# Patient Record
Sex: MALE | Race: WHITE | Employment: FULL TIME | URBAN - METROPOLITAN AREA
[De-identification: names, ages, dates, MRNs, and addresses within clinical notes are randomized per-mention and may not be internally consistent; named-entity substitution may affect disease eponyms.]

---

## 2024-12-05 RX ORDER — TRAZODONE HYDROCHLORIDE 50 MG/1
TABLET, FILM COATED ORAL
Qty: 90 TABLET | Refills: 0 | OUTPATIENT
Start: 2024-12-05

## 2025-01-06 RX ORDER — PRAVASTATIN SODIUM 20 MG
TABLET ORAL
Qty: 90 TABLET | Refills: 0 | OUTPATIENT
Start: 2025-01-06

## 2025-01-06 RX ORDER — OMEPRAZOLE 20 MG/1
20 CAPSULE, DELAYED RELEASE ORAL DAILY
Qty: 90 CAPSULE | Refills: 0 | OUTPATIENT
Start: 2025-01-06

## 2025-01-09 RX ORDER — PRAVASTATIN SODIUM 20 MG
TABLET ORAL
Qty: 90 TABLET | Refills: 0 | OUTPATIENT
Start: 2025-01-09

## 2025-02-24 ENCOUNTER — OFFICE VISIT (OUTPATIENT)
Age: 65
End: 2025-02-24
Payer: COMMERCIAL

## 2025-02-24 DIAGNOSIS — E11.65 TYPE 2 DIABETES MELLITUS WITH HYPERGLYCEMIA, WITHOUT LONG-TERM CURRENT USE OF INSULIN (HCC): Primary | ICD-10-CM

## 2025-02-24 PROCEDURE — G0108 DIAB MANAGE TRN  PER INDIV: HCPCS

## 2025-02-24 NOTE — PROGRESS NOTES
[facility]  Diabetes Self-Management Education & Support Program    Reason for Referral: T2DM  Referral Source: Sakina Mei, BORIS*  Services requested: DSMES       ASSESSMENT    From my perspective, the participant would benefit from DSMES specifically related to reducing risks, healthy eating, monitoring, taking medications, physical activity, healthy coping, and problem solving. Will adapt DSMES program to build on participant's skills score, confidence score, and preparedness score as noted in the Diabetes Skills, Confidence, and Preparedness Index.    During the program, we will focus on providing DSMES that specifically addresses participant's interest in reducing risks, healthy eating, monitoring, taking medications, physical activity, healthy coping, and problem solving, as shown by their reported readiness to change.    The participant would be best served by attending weekly individual sessions.    Diabetes Self-Management Education Follow-up Visit: 3/10/25       Clinical Presentation  Paddy Mcneil is a 64 y.o. White male referred for diabetes self-management education. Participant has Type 2 DM not on insulin for 11-20 years. Family history positive for diabetes.     Patient reports not receiving DSMES services in the past.     ost recent A1c value: 7.9 (2/3/25)    Diabetes-related medical history:    Diabetes-related medications:  Current dosing: This patient does not have an active medication from one of the medication groupers.  Metformin x2 day   Jardiance once daily     Blood Pressure Management  valsartan-hydroCHLOROthiazide - 320-25 MG      Lipid Management  pravastatin - 20 MG      Clot Prevention  This patient does not have an active medication from one of the medication groupers.    Learning Assessment  Learning objectives Educator assessment (7/3/2024)   Diabetes Disease Process  The participant can   A) describe diabetes in basic terms;   B) state the type of

## 2025-03-10 ENCOUNTER — OFFICE VISIT (OUTPATIENT)
Age: 65
End: 2025-03-10
Payer: COMMERCIAL

## 2025-03-10 DIAGNOSIS — E11.65 TYPE 2 DIABETES MELLITUS WITH HYPERGLYCEMIA, WITHOUT LONG-TERM CURRENT USE OF INSULIN (HCC): Primary | ICD-10-CM

## 2025-03-10 PROCEDURE — G0108 DIAB MANAGE TRN  PER INDIV: HCPCS

## 2025-03-10 NOTE — PROGRESS NOTES
[facility]  Diabetes Self-Management Education & Support Program  Encounter Note      SUMMARY  Diabetes self-care management training was completed related to what is diabetes and reducing risks. The participant will return on March 24 to continue DSMES related to healthy eating and monitoring. The participant did not identify SMART Goal(s) and will practice knowledge and skills related to reducing risks and healthy eating to improve diabetes self-management.        EVALUATION:  Paddy verbalized his understanding of the diabetes disease process and recommended vaccinations. He expressed that he understands the role of vaccinations and is up to date with the flu, pneumococcal, shingles, and Tdap vaccines. Paddy shared that he has peripheral neuropathy and experiences tingling in the toes of his left foot and leg. He has a podiatrist but hasn’t gone since 2021. He checks his feet daily.     He uses a glucometer to monitor BG and was given a glucose log. Paddy knows ADA blood glucose targets.    RECOMMENDATIONS:  Utilize glucose log and share with diabetes team and PCP  Continue DSMES       TOPICS DISCUSSED TODAY:  WHAT IS DIABETES? Minutes: 30  HOW DO I STAY HEALTHY? 30         DATE DSMES TOPIC EVALUATION     3/10/2025 WHAT IS DIABETES?   Role of the normal pancreas in energy balance and blood glucose control   The defect seen in diabetes   Signs & symptoms of diabetes   Diagnosis of diabetes   Types of diabetes   Blood glucose targets in non-pregnant & non-pregnant adults       The participant knows  Their type of diabetes: Yes   The basic physiologic defect: Yes  Blood glucose targets: Yes     DATE DSMES TOPIC EVALUATION     3/10/2025 HOW DO I STAY HEALTHY?   Prevention   Vaccinations   Preconception care (if applicable)  Examinations   Eye    Foot   Diabetic complications' prevention   Dental health   Heart health   Kidney Health   Nerve health   Sleep health      The participant has a

## 2025-03-24 ENCOUNTER — OFFICE VISIT (OUTPATIENT)
Age: 65
End: 2025-03-24
Payer: COMMERCIAL

## 2025-03-24 DIAGNOSIS — E11.65 TYPE 2 DIABETES MELLITUS WITH HYPERGLYCEMIA, WITHOUT LONG-TERM CURRENT USE OF INSULIN (HCC): Primary | ICD-10-CM

## 2025-03-24 PROCEDURE — G0108 DIAB MANAGE TRN  PER INDIV: HCPCS

## 2025-03-24 NOTE — PROGRESS NOTES
[facility]  Diabetes Self-Management Education & Support Program  Encounter Note      SUMMARY  Diabetes self-care management training was completed related to healthy eating. The participant will return on March 31 to continue DSMES related to healthy eating and monitoring. The participant did not identify SMART Goal(s) and will practice knowledge and skills related to healthy eating to improve diabetes self-management.      EVALUATION:  Today, we discussed healthy eating regarding the healthy plate, general nutrition guidelines, and the recommendation of 45-60 grams of carbohydrates per meal. Paddy shared that he has decreased the amount of sugar he uses to 3/4 cup in approximately 2 gallons of tea. He has also increased his water intake and is incorporating non-starchy vegetables for snacks, along with lightly salted peanuts. However, he continues to consume sugar-sweetened beverages and baked goods twice a day. Paddy is gradually making changes to his diet. We will continue healthy eating education at his next appointment.    RECOMMENDATIONS:  Utilize healthy plate   Recommend eating a meal every 4-5 hours   Low/ no carb snacks in between meals  45-60 grams CHOs per meal  Continues DSMES     TOPICS DISCUSSED TODAY:  WHAT CAN I EAT? 60         DATE DSMES TOPIC EVALUATION     3/24/2025 WHAT CAN I EAT?   General principles   Determining a healthy weight   Nutritional terms & tools   Healthy Plate method   Carbohydrate Counting   Reading food labels   Free apps   Pregnancy recommendations      The participant   Uses Healthy Plate principles in constructing meals: Yes  Reads food labels in choosing acceptable foods: No    Will continue healthy eating.      Isabelle Massey RD on 3/24/2025 at 2:15 PM    I have personally reviewed the health record, including provider notes, laboratory data and current medications before making these care and education recommendations.   Total minutes: 60

## 2025-03-31 ENCOUNTER — OFFICE VISIT (OUTPATIENT)
Age: 65
End: 2025-03-31
Payer: COMMERCIAL

## 2025-03-31 DIAGNOSIS — E11.65 TYPE 2 DIABETES MELLITUS WITH HYPERGLYCEMIA, WITHOUT LONG-TERM CURRENT USE OF INSULIN (HCC): Primary | ICD-10-CM

## 2025-03-31 PROCEDURE — G0108 DIAB MANAGE TRN  PER INDIV: HCPCS

## 2025-03-31 NOTE — PROGRESS NOTES
[facility]  Diabetes Self-Management Education & Support Program  Encounter Note      SUMMARY  Diabetes self-care management training was completed related to healthy eating. The participant will return on April 07 to continue DSMES related to reducing risks and monitoring. The participant did identify SMART Goal(s) and will practice knowledge and skills related to healthy eating to improve diabetes self-management.      EVALUATION:    Today we finished healthy eating. We discussed building meals of 45-60 grams of CHOs, all sources of carbohydrates, reading nutrition labels properly, and planning meals when eating at restaurants. Paddy demonstrated understanding by building carb controlled meals he enjoys, reading nutrition labels from sample packages, and creating a low carb healthy snack list. Paddy continues to drink orange juice, chocolate or strawberry milk in the morning (uses 1 squeeze of strawberry or chocolate syrup). He makes sweet tea with 1/2 cup of sugar and dilutes the tea. On weekends he likes to have ~1 cup of chocolate ice cream as a late night snack.     Fasting blood glucose:    3/30 164   3/31 176    Paddy is aware of glucose targets.     RECOMMENDATIONS:  Encouraged zero calorie sugar substitutes for beverages  Encouraged low carb snacks in between meals  Continue to follow diabetes nutrition guidelines   Continue DSMES     TOPICS DISCUSSED TODAY:  WHAT CAN I EAT? 60       SMART GOAL(S)   Practice building a balanced meal for 3 at least 3 times over the next week.  ACHIEVEMENT OF GOAL(S) : 0-24%       DATE DSMES TOPIC EVALUATION     3/31/2025 WHAT CAN I EAT?   General principles   Determining a healthy weight   Nutritional terms & tools   Healthy Plate method   Carbohydrate Counting   Reading food labels   Free apps   Pregnancy recommendations      The participant   Uses Healthy Plate principles in constructing meals: Yes  Reads food labels in choosing acceptable foods:

## 2025-04-07 ENCOUNTER — OFFICE VISIT (OUTPATIENT)
Age: 65
End: 2025-04-07
Payer: COMMERCIAL

## 2025-04-07 DIAGNOSIS — E11.65 TYPE 2 DIABETES MELLITUS WITH HYPERGLYCEMIA, WITHOUT LONG-TERM CURRENT USE OF INSULIN (HCC): Primary | ICD-10-CM

## 2025-04-07 PROCEDURE — G0108 DIAB MANAGE TRN  PER INDIV: HCPCS

## 2025-04-07 NOTE — PROGRESS NOTES
needs to address replacing regular sugar with a zero calorie sweetener.     Isabelle Massey RD on 4/7/2025 at 11:06 AM    I have personally reviewed the health record, including provider notes, laboratory data and current medications before making these care and education recommendations.   Total minutes: 60

## 2025-04-14 ENCOUNTER — OFFICE VISIT (OUTPATIENT)
Age: 65
End: 2025-04-14
Payer: COMMERCIAL

## 2025-04-14 DIAGNOSIS — E11.65 TYPE 2 DIABETES MELLITUS WITH HYPERGLYCEMIA, WITHOUT LONG-TERM CURRENT USE OF INSULIN (HCC): Primary | ICD-10-CM

## 2025-04-14 PROCEDURE — G0108 DIAB MANAGE TRN  PER INDIV: HCPCS

## 2025-04-14 NOTE — PROGRESS NOTES
[facility]  Diabetes Self-Management Education & Support Program  Encounter Note      SUMMARY  Diabetes self-care management training was completed related to reducing risks and monitoring. The participant will return on April 28 to continue DSMES related to taking medications and physical activity. The participant did identify SMART Goal(s) and will practice knowledge and skills related to reducing risks and monitoring to improve diabetes self-management.      EVALUATION:  Today we discussed reducing risk and monitoring. Paddy asked great questions and all questions were answered. He demonstrated understanding of diabetes disease process and recommended targets for A1c, BP, BG, and LDL cholesterol. He verbalized understanding of the role of vaccinations, eye/dental/foot exams in preventing DM complications and the relationships between DM and heart/kidney/nerve/sleep health. Paddy is up to date on all vaccines and is due for a foot and dentist exam. He shared he has diabetic neuropathy in his feet and plans to make an appointment with a Podiatrist before our next appointment.      Paddy uses a glucometer to check BG twice a day. He's aware of how to properly check BG and dispose of sharps. Blood sugar is still out of target sometimes d/t regular sweetened tea, desserts, and regular sodas; however he has decreased consumption. Paddy continues to make gradually healthy eating habits    RECOMMENDATIONS:  Continue to follow nutrition guidelines   Would benefit from a foot and dental exam  Utilize the personal care record   Utilize BG log book  Encourage to check feet daily   Encourage to transition to zero calorie sweeteners and diet drinks   Continue DSMES     TOPICS DISCUSSED TODAY:  HOW DO I STAY HEALTHY? 30  HOW CAN BLOOD GLUCOSE MONITORING HELP ME? 30      Next provider visit is scheduled for   Future Appointments         Provider Specialty Dept Phone    4/28/2025 8:15 AM Isabelle Massey

## 2025-04-28 ENCOUNTER — OFFICE VISIT (OUTPATIENT)
Age: 65
End: 2025-04-28
Payer: COMMERCIAL

## 2025-04-28 DIAGNOSIS — E11.65 TYPE 2 DIABETES MELLITUS WITH HYPERGLYCEMIA, WITHOUT LONG-TERM CURRENT USE OF INSULIN (HCC): Primary | ICD-10-CM

## 2025-04-28 PROCEDURE — G0108 DIAB MANAGE TRN  PER INDIV: HCPCS

## 2025-04-28 NOTE — PROGRESS NOTES
medications   Oral agents   GLP-1 agonists   Hypoglycemia symptoms & treatment   Glucagon emergency kits   General guidance regarding insulin use whether Type 1, 2 or gestational diabetes   Storage of insulin   Disposal    Traveling with medications   Barriers to medication adherence      The participant   Can describe the expected action & side effects of prescribed diabetes medications: Yes  Can demonstrate injection technique (if applicable): Yes       DATE DSMES TOPIC EVALUATION     4/28/2025 HOW DOES PHYSICAL ACTIVITY AFFECT MY DIABETES?   Benefits of physical activity   Beginning a program of physical activity   Walking   Pedometers   Goal setting   Structured physical activity program   Aerobic activity   Resistance   Flexibility   Balance   Physical activity program progression   Safety issues   Barriers to physical activity   Facilitators of physical activity        The participant has established a regular physical activity plan: No    The participant needs to address incorporating a low impact aerobic activity.     Isabelle Massey RD on 4/28/2025 at 1:11 PM    I have personally reviewed the health record, including provider notes, laboratory data and current medications before making these care and education recommendations.   Total minutes: 60

## 2025-05-02 ENCOUNTER — OFFICE VISIT (OUTPATIENT)
Age: 65
End: 2025-05-02
Payer: COMMERCIAL

## 2025-05-02 DIAGNOSIS — E11.65 TYPE 2 DIABETES MELLITUS WITH HYPERGLYCEMIA, WITHOUT LONG-TERM CURRENT USE OF INSULIN (HCC): Primary | ICD-10-CM

## 2025-05-02 PROCEDURE — G0108 DIAB MANAGE TRN  PER INDIV: HCPCS

## 2025-05-02 NOTE — PROGRESS NOTES
[facility]  Diabetes Self-Management Education & Support Program  Encounter Note      SUMMARY  Diabetes self-care management training was completed related to healthy coping and problem solving. The participant will return on June 16 for post program evaluation. The participant did not identify SMART Goal(s) and will practice knowledge and skills related to healthy coping and problem solving to improve diabetes self-management.      EVALUATION:  Paddy shared he feels supported in her diabetes self management by his family and providers. He enjoys working the garage, gardening and completing outdoor projects to reduce stress. He plans to use the ADA and CDC website as part of his diabetes self management plan going forward.  He expressed understanding of using problem solving strategies to overcome barriers to diabetes self management. Paddy shared he is positively moving forward with diabetes because he feel she was provided the proper education.     RECOMMENDATIONS:  Continue to follow nutrition guidelines for diabetes   Continue physical activity   Continue to manage stress       Continue to monitor BG     TOPICS DISCUSSED TODAY:  HOW DO I FIND SUPPORT TO TACKLE THIS CONDITION? 30  HOW DO I FIGURE OUT WHAT'S INFLUENCING MY BLOOD GLUCOSES? 30      Next provider visit is scheduled for   Future Appointments         Provider Specialty Dept Phone    6/16/2025 8:15 AM Isabelle Massey RD [facility] [phone]                SMART GOAL(S)    Do 10 minutes of low impact cardio every other day. (Goal set 4/28/25)  ACHIEVEMENT OF GOAL(S) : 50-74%       DATE DSMES TOPIC EVALUATION     5/2/2025 HOW DO I FIND SUPPORT TO TACKLE THIS CONDITION?   Normal responses to diabetes diagnosis or complication   Shock   Anger & resentment   Guilt/self-blame   Sadness & worry   Depression    Anxiety   Pregnancy   Constructive strategies to normal responses    Exploring feelings & attitudes   Motivation:

## 2025-07-07 ENCOUNTER — OFFICE VISIT (OUTPATIENT)
Age: 65
End: 2025-07-07
Payer: COMMERCIAL

## 2025-07-07 DIAGNOSIS — E11.65 TYPE 2 DIABETES MELLITUS WITH HYPERGLYCEMIA, WITHOUT LONG-TERM CURRENT USE OF INSULIN (HCC): Primary | ICD-10-CM

## 2025-07-07 PROCEDURE — G0108 DIAB MANAGE TRN  PER INDIV: HCPCS

## 2025-07-07 NOTE — PROGRESS NOTES
[facility]  Diabetes Self-Management Education & Support Program  Post-program Evaluation    EVALUATION @ COMPLETION OF THE PROGRAM    Paddy Mcneil completed 10 hours of diabetes self-management education. The participant acquired new knowledge and demonstrated new skills during the program.     The participant worked on the following SMART GOAL(s) to improve their diabetes self-management:      Practice building a balanced meal for 3 at least 3 times over the next week.  ACHIEVEMENT OF GOAL(S) : %    2.   Read labels of packaged foods 5 times over the next week.  ACHIEVEMENT OF GOAL(S) : %      The participant's pre-program A1c was No results found for: 7.9. The post-evaluation A1c is 6.8.    The participant improved their Diabetes Skills, Confidence and Preparedness Index (scored out of 7):    Total score: 6.6  Skills: 6.11  Confidence: 7  Preparedness: 6.57    FINAL RECOMMENDATIONS:  Continue to follow nutrition guidelines  Check BG 2 hours after meals occasionally   Continue to decrease sugar sweetened beverages   Encourage nutrient dense CHOs      Isabelle Massey RD on 7/7/2025     Metric Patient's responses (7/7/2025) Areas for improvement     Healthy Eating       The participant is using the Healthy Plate to control carbohydrate intake - Yes    The participant reads food labels accurately - Yes          Being Active       The participant performs physical activity where your heart beats faster and your breathing is harder than normal for 30 minutes or more? 2 day(s)     -Do many outdoor home projects that require intense physical labor     In a typical week, the participant performs physical activity 2 day(s)          Monitoring   The participant is monitoring their blood sugars? Yes    The participant is monitoring 1x/day    Blood sugar ranges are 100-150 mg/dL    The participant improved their A1c - Yes    The participant understands the meaning of the A1c - Yes